# Patient Record
Sex: FEMALE | Race: WHITE | Employment: UNEMPLOYED | URBAN - METROPOLITAN AREA
[De-identification: names, ages, dates, MRNs, and addresses within clinical notes are randomized per-mention and may not be internally consistent; named-entity substitution may affect disease eponyms.]

---

## 2018-02-15 PROCEDURE — 86803 HEPATITIS C AB TEST: CPT | Performed by: INTERNAL MEDICINE

## 2018-12-22 ENCOUNTER — OFFICE VISIT (OUTPATIENT)
Dept: FAMILY MEDICINE CLINIC | Facility: CLINIC | Age: 63
End: 2018-12-22
Payer: COMMERCIAL

## 2018-12-22 VITALS
RESPIRATION RATE: 20 BRPM | SYSTOLIC BLOOD PRESSURE: 121 MMHG | WEIGHT: 125 LBS | HEART RATE: 74 BPM | BODY MASS INDEX: 21 KG/M2 | TEMPERATURE: 98 F | OXYGEN SATURATION: 100 % | DIASTOLIC BLOOD PRESSURE: 62 MMHG

## 2018-12-22 DIAGNOSIS — R51.9 SINUS HEADACHE: Primary | ICD-10-CM

## 2018-12-22 PROCEDURE — 99213 OFFICE O/P EST LOW 20 MIN: CPT | Performed by: NURSE PRACTITIONER

## 2018-12-22 RX ORDER — AMOXICILLIN AND CLAVULANATE POTASSIUM 875; 125 MG/1; MG/1
1 TABLET, FILM COATED ORAL 2 TIMES DAILY
Qty: 20 TABLET | Refills: 0 | Status: SHIPPED | OUTPATIENT
Start: 2018-12-22 | End: 2019-01-01

## 2018-12-22 NOTE — PROGRESS NOTES
CHIEF COMPLAINT:   Patient presents with:  Sinusitis      HPI:   Jules Medellin is a 66year old female who presents for cold symptoms for  4  weeks. Symptoms have progressed into sinus congestion and been worsening since onset.  Sinus congestion/pain is lung and liver   • High Blood Pressure Father    • Heart Disease Father    • Heart Disorder Father    • Heart Attack Father         AMI   • High Blood Pressure Sister    • Heart Disease Sister 52        stent, mi   • Heart Disorder Sister 50        MI Agueda Bustillos is a 66year old female who presents with URI symptoms that are consistent with:      ASSESSMENT:  Sinus headache  (primary encounter diagnosis)      PLAN: Meds as below.   Comfort care instructions as listed in Patient Instructions    Med The doctor may take a sample of mucus to check for bacteria. If you have sinusitis that keeps coming back, you may need imaging tests such as X-rays or CAT scans. This will help your doctor check for a structural problem that may be causing the infection.

## 2018-12-22 NOTE — PATIENT INSTRUCTIONS
Use the antibiotic  Use antihistamine daily for sinus/allergies. Tylenol only for pain      Acute Sinusitis    Acute sinusitis is irritation and swelling of the sinuses. It is usually caused by a viral infection after a common cold.  Your doctor can help y even if you feel better. Date Last Reviewed: 10/1/2016  © 9850-7155 [employer]. [address] [facility] [place] [address] [place]. All rights reserved. This information is not intended as a substitute for professional medical care.  Always follow yo

## 2019-06-28 ENCOUNTER — HOSPITAL ENCOUNTER (EMERGENCY)
Facility: HOSPITAL | Age: 64
Discharge: HOME | End: 2019-06-28
Payer: COMMERCIAL

## 2019-06-28 ENCOUNTER — APPOINTMENT (OUTPATIENT)
Dept: GENERAL RADIOLOGY | Facility: HOSPITAL | Age: 64
End: 2019-06-28
Payer: COMMERCIAL

## 2019-06-28 VITALS
HEART RATE: 71 BPM | SYSTOLIC BLOOD PRESSURE: 147 MMHG | RESPIRATION RATE: 17 BRPM | WEIGHT: 125 LBS | OXYGEN SATURATION: 100 % | TEMPERATURE: 98 F | BODY MASS INDEX: 22.15 KG/M2 | HEIGHT: 63 IN | DIASTOLIC BLOOD PRESSURE: 69 MMHG

## 2019-06-28 DIAGNOSIS — S82.892A CLOSED FRACTURE OF LEFT ANKLE, INITIAL ENCOUNTER: Primary | ICD-10-CM

## 2019-06-28 PROCEDURE — 29515 APPLICATION SHORT LEG SPLINT: CPT

## 2019-06-28 PROCEDURE — 73610 X-RAY EXAM OF ANKLE: CPT

## 2019-06-28 PROCEDURE — 99284 EMERGENCY DEPT VISIT MOD MDM: CPT

## 2019-06-28 RX ORDER — HYDROCODONE BITARTRATE AND ACETAMINOPHEN 5; 325 MG/1; MG/1
1 TABLET ORAL ONCE
Status: COMPLETED | OUTPATIENT
Start: 2019-06-28 | End: 2019-06-28

## 2019-06-28 NOTE — ED PROVIDER NOTES
Patient Seen in: [facility] Emergency Department    History   Patient presents with:  Lower Extremity Injury (musculoskeletal)    Stated Complaint: left ankle injury    HPI    Patient is a 81-year-old female complaining of injury to her left ankle. Supple, full range of motion. CV: Chest is clear to auscultation, no wheezes rales or rhonchi. Cardiac exam normal S1-S2, no murmurs rubs or gallops. Abdomen: Soft, nontender, nondistended. Bowel sounds present throughout.   Extremities: Warm and well p ---------------------------------------------------------------------------------------------------- FINDINGS:    Breast Density Category C-The breasts are heterogeneously dense, which limits the sensitivity of mammography.   Given the density of the breast

## 2019-07-13 ENCOUNTER — OFFICE VISIT (OUTPATIENT)
Dept: FAMILY MEDICINE CLINIC | Facility: CLINIC | Age: 64
End: 2019-07-13
Payer: COMMERCIAL

## 2019-07-13 VITALS
SYSTOLIC BLOOD PRESSURE: 122 MMHG | DIASTOLIC BLOOD PRESSURE: 68 MMHG | TEMPERATURE: 99 F | BODY MASS INDEX: 21.97 KG/M2 | HEART RATE: 80 BPM | OXYGEN SATURATION: 98 % | WEIGHT: 124 LBS | RESPIRATION RATE: 18 BRPM | HEIGHT: 63 IN

## 2019-07-13 DIAGNOSIS — R21 RASH AND NONSPECIFIC SKIN ERUPTION: Primary | ICD-10-CM

## 2019-07-13 PROCEDURE — 99213 OFFICE O/P EST LOW 20 MIN: CPT | Performed by: NURSE PRACTITIONER

## 2019-07-13 RX ORDER — PREDNISONE 20 MG/1
40 TABLET ORAL DAILY
Qty: 10 TABLET | Refills: 0 | Status: SHIPPED | OUTPATIENT
Start: 2019-07-13 | End: 2019-07-18

## 2019-07-13 NOTE — PATIENT INSTRUCTIONS
1. Rest. Keep areas clean. 2. Prednisone as prescribed. Take with food. 3. Supportive care as discussed. 4. Hold off on diclofenac while taking prednisone. Resume once prednisone course is complete.   5. Follow up with PMD in 3-4 days for reeval. Follow not applicable

## 2019-07-13 NOTE — PROGRESS NOTES
CHIEF COMPLAINT:   Patient presents with:  Rash       HPI:    Lissette Figueroa is a 60year old female who presents for evaluation of a rash. Per patient rash started in the past 3 days.  She notes the rash initially started as \"a few red itchy bumps\" u Hypothyroidism    • OSTEOARTHRITIS    • OTHER DISEASES     pruritis      Past Surgical History:   Procedure Laterality Date   • BENIGN BIOPSY RIGHT  2008   • BIOPSY OF BREAST, INCISIONAL  8/2008    atypical lobular hyperplasia   • COLONOSCOPY  4/13/11 - T. GENERAL: well developed, well nourished,in no apparent distress  SKIN: There are erythematous papules and a few scattered visicles noted to right and left side of neck, below pt's chin and over right mandibular area.  No open lesions, drainage, swelling, notes she has tolerated it well before. We discussed 5,7, 13, and 21 day course with the longer courses having a taper. I  recommended a longer course with taper but pt prefers 5 day course.  Will start prednisone 40mg daily today  along with supportive care

## 2019-07-23 PROBLEM — S82.892D CLOSED FRACTURE OF LEFT ANKLE WITH ROUTINE HEALING, SUBSEQUENT ENCOUNTER: Status: ACTIVE | Noted: 2019-07-23

## 2019-11-20 ENCOUNTER — IMMUNIZATION (OUTPATIENT)
Dept: FAMILY MEDICINE CLINIC | Facility: CLINIC | Age: 64
End: 2019-11-20
Payer: COMMERCIAL

## 2019-11-20 DIAGNOSIS — Z23 NEED FOR VACCINATION: ICD-10-CM

## 2019-11-20 PROCEDURE — 90471 IMMUNIZATION ADMIN: CPT | Performed by: PHYSICIAN ASSISTANT

## 2019-11-20 PROCEDURE — 90686 IIV4 VACC NO PRSV 0.5 ML IM: CPT | Performed by: PHYSICIAN ASSISTANT

## 2020-02-20 ENCOUNTER — OFFICE VISIT (OUTPATIENT)
Dept: FAMILY MEDICINE CLINIC | Facility: CLINIC | Age: 65
End: 2020-02-20
Payer: COMMERCIAL

## 2020-02-20 VITALS
SYSTOLIC BLOOD PRESSURE: 119 MMHG | DIASTOLIC BLOOD PRESSURE: 73 MMHG | HEART RATE: 80 BPM | BODY MASS INDEX: 22.32 KG/M2 | TEMPERATURE: 98 F | HEIGHT: 63 IN | WEIGHT: 126 LBS | RESPIRATION RATE: 12 BRPM | OXYGEN SATURATION: 99 %

## 2020-02-20 DIAGNOSIS — J01.10 ACUTE NON-RECURRENT FRONTAL SINUSITIS: Primary | ICD-10-CM

## 2020-02-20 PROCEDURE — 99213 OFFICE O/P EST LOW 20 MIN: CPT | Performed by: PHYSICIAN ASSISTANT

## 2020-02-20 RX ORDER — AMOXICILLIN 500 MG/1
1000 TABLET, FILM COATED ORAL 2 TIMES DAILY
Qty: 40 TABLET | Refills: 0 | Status: SHIPPED | OUTPATIENT
Start: 2020-02-20 | End: 2020-03-01

## 2020-02-20 NOTE — PROGRESS NOTES
CHIEF COMPLAINT:   Patient presents with:  Sinusitis      HPI:   Phan Rand is a 63year old female who presents for cold symptoms for one month. +sinus headache. +nasal congestion. Bloody nose on and off. No ear pain. Post nasal drip in the throat. Heart Disease Sister       Social History    Tobacco Use      Smoking status: Never Smoker      Smokeless tobacco: Never Used    Alcohol use: Yes      Alcohol/week: 2.0 standard drinks      Types: 2 Standard drinks or equivalent per week    Drug use:  No daily for 10 days. Risks, benefits, side effects of medication addressed and explained.     Patient Instructions   Saline nasal rinse   Tylenol/ibuprofen as needed   Rest   Fluids   Stop Flonase   Please follow up with PCP if no improvement or if symp

## 2020-02-20 NOTE — PATIENT INSTRUCTIONS
Saline nasal rinse   Tylenol/ibuprofen as needed   Rest   Fluids   Stop Flonase   Please follow up with PCP if no improvement or if symptoms worsen

## 2020-03-16 ENCOUNTER — HOSPITAL ENCOUNTER (EMERGENCY)
Facility: HOSPITAL | Age: 65
Discharge: HOME | End: 2020-03-16
Attending: EMERGENCY MEDICINE
Payer: COMMERCIAL

## 2020-03-16 ENCOUNTER — APPOINTMENT (OUTPATIENT)
Dept: CT IMAGING | Facility: HOSPITAL | Age: 65
End: 2020-03-16
Attending: NURSE PRACTITIONER
Payer: COMMERCIAL

## 2020-03-16 VITALS
BODY MASS INDEX: 22.15 KG/M2 | OXYGEN SATURATION: 100 % | HEART RATE: 66 BPM | WEIGHT: 125 LBS | DIASTOLIC BLOOD PRESSURE: 66 MMHG | RESPIRATION RATE: 18 BRPM | SYSTOLIC BLOOD PRESSURE: 123 MMHG | HEIGHT: 63 IN | TEMPERATURE: 98 F

## 2020-03-16 DIAGNOSIS — R89.9 ABNORMAL LABORATORY TEST RESULT: Primary | ICD-10-CM

## 2020-03-16 LAB
ALBUMIN SERPL-MCNC: 3.2 G/DL (ref 3.4–5)
ALBUMIN/GLOB SERPL: 0.7 {RATIO} (ref 1–2)
ALP LIVER SERPL-CCNC: 117 U/L (ref 50–130)
ALT SERPL-CCNC: 37 U/L (ref 13–56)
ANION GAP SERPL CALC-SCNC: 3 MMOL/L (ref 0–18)
AST SERPL-CCNC: 33 U/L (ref 15–37)
BASOPHILS # BLD AUTO: 0.03 X10(3) UL (ref 0–0.2)
BASOPHILS NFR BLD AUTO: 0.6 %
BILIRUB SERPL-MCNC: 0.3 MG/DL (ref 0.1–2)
BILIRUB UR QL STRIP.AUTO: NEGATIVE
BUN BLD-MCNC: 16 MG/DL (ref 7–18)
BUN/CREAT SERPL: 20.8 (ref 10–20)
CALCIUM BLD-MCNC: 9.3 MG/DL (ref 8.5–10.1)
CHLORIDE SERPL-SCNC: 111 MMOL/L (ref 98–112)
CLARITY UR REFRACT.AUTO: CLEAR
CO2 SERPL-SCNC: 26 MMOL/L (ref 21–32)
COLOR UR AUTO: YELLOW
CREAT BLD-MCNC: 0.77 MG/DL (ref 0.55–1.02)
DEPRECATED RDW RBC AUTO: 41.3 FL (ref 35.1–46.3)
EOSINOPHIL # BLD AUTO: 0.1 X10(3) UL (ref 0–0.7)
EOSINOPHIL NFR BLD AUTO: 1.9 %
ERYTHROCYTE [DISTWIDTH] IN BLOOD BY AUTOMATED COUNT: 11.4 % (ref 11–15)
GLOBULIN PLAS-MCNC: 4.9 G/DL (ref 2.8–4.4)
GLUCOSE BLD-MCNC: 104 MG/DL (ref 70–99)
GLUCOSE UR STRIP.AUTO-MCNC: NEGATIVE MG/DL
HCT VFR BLD AUTO: 35.6 % (ref 35–48)
HGB BLD-MCNC: 12.1 G/DL (ref 12–16)
IMM GRANULOCYTES # BLD AUTO: 0.02 X10(3) UL (ref 0–1)
IMM GRANULOCYTES NFR BLD: 0.4 %
KETONES UR STRIP.AUTO-MCNC: NEGATIVE MG/DL
LEUKOCYTE ESTERASE UR QL STRIP.AUTO: NEGATIVE
LIPASE SERPL-CCNC: 129 U/L (ref 73–393)
LYMPHOCYTES # BLD AUTO: 1.66 X10(3) UL (ref 1–4)
LYMPHOCYTES NFR BLD AUTO: 30.9 %
M PROTEIN MFR SERPL ELPH: 8.1 G/DL (ref 6.4–8.2)
MCH RBC QN AUTO: 33.4 PG (ref 26–34)
MCHC RBC AUTO-ENTMCNC: 34 G/DL (ref 31–37)
MCV RBC AUTO: 98.3 FL (ref 80–100)
MONOCYTES # BLD AUTO: 0.63 X10(3) UL (ref 0.1–1)
MONOCYTES NFR BLD AUTO: 11.7 %
NEUTROPHILS # BLD AUTO: 2.94 X10 (3) UL (ref 1.5–7.7)
NEUTROPHILS # BLD AUTO: 2.94 X10(3) UL (ref 1.5–7.7)
NEUTROPHILS NFR BLD AUTO: 54.5 %
NITRITE UR QL STRIP.AUTO: NEGATIVE
OSMOLALITY SERPL CALC.SUM OF ELEC: 291 MOSM/KG (ref 275–295)
PH UR STRIP.AUTO: 6 [PH] (ref 4.5–8)
PLATELET # BLD AUTO: 229 10(3)UL (ref 150–450)
POTASSIUM SERPL-SCNC: 4.1 MMOL/L (ref 3.5–5.1)
PROT UR STRIP.AUTO-MCNC: NEGATIVE MG/DL
RBC # BLD AUTO: 3.62 X10(6)UL (ref 3.8–5.3)
SODIUM SERPL-SCNC: 140 MMOL/L (ref 136–145)
SP GR UR STRIP.AUTO: 1.01 (ref 1–1.03)
UROBILINOGEN UR STRIP.AUTO-MCNC: <2 MG/DL
WBC # BLD AUTO: 5.4 X10(3) UL (ref 4–11)

## 2020-03-16 PROCEDURE — 96361 HYDRATE IV INFUSION ADD-ON: CPT | Performed by: EMERGENCY MEDICINE

## 2020-03-16 PROCEDURE — 80053 COMPREHEN METABOLIC PANEL: CPT | Performed by: NURSE PRACTITIONER

## 2020-03-16 PROCEDURE — 83690 ASSAY OF LIPASE: CPT | Performed by: NURSE PRACTITIONER

## 2020-03-16 PROCEDURE — 99284 EMERGENCY DEPT VISIT MOD MDM: CPT | Performed by: EMERGENCY MEDICINE

## 2020-03-16 PROCEDURE — 85025 COMPLETE CBC W/AUTO DIFF WBC: CPT | Performed by: NURSE PRACTITIONER

## 2020-03-16 PROCEDURE — 96360 HYDRATION IV INFUSION INIT: CPT | Performed by: EMERGENCY MEDICINE

## 2020-03-16 PROCEDURE — 87040 BLOOD CULTURE FOR BACTERIA: CPT | Performed by: NURSE PRACTITIONER

## 2020-03-16 PROCEDURE — 74177 CT ABD & PELVIS W/CONTRAST: CPT | Performed by: NURSE PRACTITIONER

## 2020-03-16 PROCEDURE — 81001 URINALYSIS AUTO W/SCOPE: CPT | Performed by: NURSE PRACTITIONER

## 2020-03-16 RX ORDER — LEVOFLOXACIN 500 MG/1
500 TABLET, FILM COATED ORAL DAILY
Qty: 7 TABLET | Refills: 0 | Status: SHIPPED | OUTPATIENT
Start: 2020-03-16 | End: 2020-03-23

## 2020-03-16 NOTE — ED PROVIDER NOTES
Patient Seen in: [facility] Emergency Department      History   Patient presents with:  Abnormal Result    Stated Complaint: Told to come to ER for positive blood cultures.  Had repeat cultures drawn this *    HPI        Past Medical History:   Diagn Globulin  4.9 (*)     A/G Ratio 0.7 (*)     All other components within normal limits   CBC W/ DIFFERENTIAL - Abnormal; Notable for the following components:    RBC 3.62 (*)     All other components within normal limits   LIPASE - Normal   CBC WITH DIFFERE

## 2020-03-16 NOTE — ED INITIAL ASSESSMENT (HPI)
PT sent by [address] [place] [address] Care for further evaluation of positive blood cultures, Pt had second set of cultures drawn earlier today.  PT rpt she is feeling improvement since starting antibiotics

## 2020-03-16 NOTE — ED PROVIDER NOTES
Patient Seen in: [facility] Emergency Department      History   Patient presents with:  Abnormal Result    Stated Complaint: Told to come to ER for positive blood cultures.  Had repeat cultures drawn this *    HPI  64 yo female presents sent by her p 1221]   /88   Pulse 86   Resp 18   Temp 98 °F (36.7 °C)   Temp src Oral   SpO2 100 %   O2 Device None (Room air)       Current:/66   Pulse 66   Temp 98 °F (36.7 °C) (Oral)   Resp 18   Ht 160 cm (5' 3\")   Wt 56.7 kg   LMP 01/14/2009   SpO2 100% 3.2 (*)     Globulin  4.9 (*)     A/G Ratio 0.7 (*)     All other components within normal limits   URINALYSIS WITH CULTURE REFLEX - Abnormal; Notable for the following components:    Blood Urine Small (*)     Bacteria Urine Rare (*)     Mucous Urine 1+ Da Silva Kathie ([facility]) NRDR ([address]) which includes the Dose Index Registry. PATIENT STATED HISTORY:(As transcribed by Technologist)  The patient had chills and fever last week. The patient had antibiotic treatment.  The DO Gema on 3/16/2020 at 2:19 PM     Finalized by: Britton Hashimoto, DO on 3/16/2020 at 2:25 PM                MDM   Attending discussed findings with Dr. Erma De Paz with ID who would like Levaquin started and close follow up. Blood cultures sent again.   Normal

## 2020-03-18 ENCOUNTER — PATIENT OUTREACH (OUTPATIENT)
Dept: INFECTIOUS DISEASE | Facility: CLINIC | Age: 65
End: 2020-03-18

## 2020-03-18 NOTE — PROGRESS NOTES
INFECTIOUS DISEASE CONSULTATION    [facility]     1955 MRN [id number]   Location [address] [facility]       PCP Abdirahman Avilez MD       Reason for Consultation:  E coli bacteremia    History of Present Illness:  Gabbie Woods never smoked. She has never used smokeless tobacco. She reports current alcohol use of about 2.0 standard drinks of alcohol per week. She reports that she does not use drugs.     Allergies:    [id number]*    RASH    Comment:JOEL Almeida and rare bacteria, had some dysuria, that improved  CT negative  -did show diverticulosis, and also possible could have had gi translocation, but UTI more likely    Can complete levaquin, fu primary        Juan C Rodriguez MD  [address]

## 2020-04-20 PROBLEM — N39.0 RECURRENT UTI: Status: ACTIVE | Noted: 2020-04-20

## 2020-05-06 PROBLEM — N35.82 OTHER URETHRAL STRICTURE, FEMALE: Status: ACTIVE | Noted: 2020-05-06

## 2020-09-23 PROBLEM — M15.9 PRIMARY OSTEOARTHRITIS INVOLVING MULTIPLE JOINTS: Status: ACTIVE | Noted: 2020-09-23

## (undated) NOTE — ED AVS SNAPSHOT
Joesph Marlo   MRN: [id number]    Department:  [facility] Emergency Department   Date of Visit:  3/16/2020           Disclosure     Insurance plans vary and the physician(s) referred by the ER may not be covered by your plan.  Please contact y tell this physician (or your personal doctor if your instructions are to return to your personal doctor) about any new or lasting problems. The primary care or specialist physician will see patients referred from the [facility] Emergency Department.  Areli Bates

## (undated) NOTE — ED AVS SNAPSHOT
Paul Roque   MRN: [id number]    Department:  [facility] Emergency Department   Date of Visit:  6/28/2019           Disclosure     Insurance plans vary and the physician(s) referred by the ER may not be covered by your plan.  Please contact y tell this physician (or your personal doctor if your instructions are to return to your personal doctor) about any new or lasting problems. The primary care or specialist physician will see patients referred from the [facility] Emergency Department.  Darci Lew